# Patient Record
Sex: MALE | URBAN - METROPOLITAN AREA
[De-identification: names, ages, dates, MRNs, and addresses within clinical notes are randomized per-mention and may not be internally consistent; named-entity substitution may affect disease eponyms.]

---

## 2021-09-03 ENCOUNTER — OFFICE VISIT (OUTPATIENT)
Dept: URBAN - METROPOLITAN AREA CLINIC 32 | Facility: CLINIC | Age: 66
End: 2021-09-03
Payer: MEDICAID

## 2021-09-03 DIAGNOSIS — H10.023 CONJUNCTIVITIS - BACTERIAL BILATERAL: Primary | ICD-10-CM

## 2021-09-03 PROCEDURE — 92002 INTRM OPH EXAM NEW PATIENT: CPT | Performed by: OPTOMETRIST

## 2021-09-03 RX ORDER — LOTEPREDNOL ETABONATE AND TOBRAMYCIN 5; 3 MG/ML; MG/ML
SUSPENSION/ DROPS OPHTHALMIC
Qty: 5 | Refills: 6 | Status: ACTIVE
Start: 2021-09-03

## 2021-09-03 ASSESSMENT — INTRAOCULAR PRESSURE
OD: 18
OS: 23

## 2021-09-03 NOTE — IMPRESSION/PLAN
Impression: Conjunctivitis - Bacterial Bilateral: H10.023.  Plan: zylet BID OU AT PRN RTC if sx persist

## 2022-10-04 ENCOUNTER — OFFICE VISIT (OUTPATIENT)
Dept: URBAN - METROPOLITAN AREA CLINIC 32 | Facility: CLINIC | Age: 67
End: 2022-10-04
Payer: MEDICAID

## 2022-10-04 DIAGNOSIS — H25.12 AGE-RELATED NUCLEAR CATARACT OF LEFT EYE: Primary | ICD-10-CM

## 2022-10-04 PROCEDURE — 92014 COMPRE OPH EXAM EST PT 1/>: CPT | Performed by: OPTOMETRIST

## 2022-10-04 ASSESSMENT — KERATOMETRY
OS: 45.63
OD: 45.13

## 2022-10-04 ASSESSMENT — INTRAOCULAR PRESSURE
OD: 19
OS: 19

## 2022-10-04 ASSESSMENT — VISUAL ACUITY
OS: 20/25
OD: 20/25

## 2023-11-16 PROCEDURE — 99213 OFFICE O/P EST LOW 20 MIN: CPT | Performed by: OPTOMETRIST
